# Patient Record
Sex: MALE | Race: WHITE | NOT HISPANIC OR LATINO | Employment: FULL TIME | URBAN - METROPOLITAN AREA
[De-identification: names, ages, dates, MRNs, and addresses within clinical notes are randomized per-mention and may not be internally consistent; named-entity substitution may affect disease eponyms.]

---
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
## 2021-03-24 PROCEDURE — U0003 INFECTIOUS AGENT DETECTION BY NUCLEIC ACID (DNA OR RNA); SEVERE ACUTE RESPIRATORY SYNDROME CORONAVIRUS 2 (SARS-COV-2) (CORONAVIRUS DISEASE [COVID-19]), AMPLIFIED PROBE TECHNIQUE, MAKING USE OF HIGH THROUGHPUT TECHNOLOGIES AS DESCRIBED BY CMS-2020-01-R: HCPCS | Performed by: NURSE PRACTITIONER

## 2021-03-25 ENCOUNTER — TELEPHONE (OUTPATIENT)
Dept: URGENT CARE | Facility: CLINIC | Age: 21
End: 2021-03-25

## 2021-03-25 NOTE — TELEPHONE ENCOUNTER
----- Message from ABI Mabry sent at 3/25/2021 12:36 PM EDT -----  Please contact patient with test results.  Continue plan of care.  Follow-up with PCP.

## 2024-03-28 ENCOUNTER — OFFICE VISIT (OUTPATIENT)
Dept: FAMILY MEDICINE CLINIC | Facility: CLINIC | Age: 24
End: 2024-03-28
Payer: COMMERCIAL

## 2024-03-28 VITALS
TEMPERATURE: 98.2 F | OXYGEN SATURATION: 96 % | SYSTOLIC BLOOD PRESSURE: 128 MMHG | DIASTOLIC BLOOD PRESSURE: 70 MMHG | HEART RATE: 63 BPM | RESPIRATION RATE: 18 BRPM | BODY MASS INDEX: 29.62 KG/M2 | WEIGHT: 200 LBS | HEIGHT: 69 IN

## 2024-03-28 DIAGNOSIS — L98.9 SKIN LESION OF NECK: Primary | ICD-10-CM

## 2024-03-28 DIAGNOSIS — Z76.89 ENCOUNTER TO ESTABLISH CARE: ICD-10-CM

## 2024-03-28 DIAGNOSIS — Z87.898 HISTORY OF PALPITATIONS: ICD-10-CM

## 2024-03-28 DIAGNOSIS — M79.89 NODULE OF SOFT TISSUE: ICD-10-CM

## 2024-03-28 PROBLEM — L25.9 CONTACT DERMATITIS: Status: RESOLVED | Noted: 2018-09-19 | Resolved: 2024-03-28

## 2024-03-28 PROBLEM — M54.50 LOW BACK PAIN: Status: RESOLVED | Noted: 2024-03-28 | Resolved: 2024-03-28

## 2024-03-28 PROBLEM — U07.1 COVID-19: Status: RESOLVED | Noted: 2021-03-25 | Resolved: 2024-03-28

## 2024-03-28 NOTE — PROGRESS NOTES
"Chief Complaint  Establish Care    Subjective        Alok Cespedes presents to [facility]  History of Present Illness    Patient presents today to establish care. Last PCP with Dr. Robledo in [place].  Reported history of palpitations while exercising at the gym.  This has occurred on at least 3 occasions; none recently.  Negative for any chest pain, dizziness, shortness of breath, diaphoresis, nausea and vomiting.  Reported wife concerned about skin lesions. Onset months ago. Status is changing.   Location of affected area include left neck and upper back. Quality is nodular, papular. Aggravated by nothing. Relieved by nothing.  Pertinent negatives include bleeding, edema, erythema.         Objective   Vital Signs:  /70 (BP Location: Left arm, Patient Position: Sitting, Cuff Size: Adult)   Pulse 63   Temp 98.2 °F (36.8 °C)   Resp 18   Ht 175.3 cm (69\")   Wt 90.7 kg (200 lb)   SpO2 96%   BMI 29.53 kg/m²   Estimated body mass index is 29.53 kg/m² as calculated from the following:    Height as of this encounter: 175.3 cm (69\").    Weight as of this encounter: 90.7 kg (200 lb).             Physical Exam  Constitutional:       General: He is not in acute distress.     Appearance: He is well-groomed and overweight.      Comments: Pleasant, converses appropriately    HENT:      Head: Normocephalic and atraumatic.      Right Ear: External ear normal.      Left Ear: External ear normal.      Nose: Nose normal.      Mouth/Throat:      Lips: Pink.      Mouth: Mucous membranes are moist.      Pharynx: Oropharynx is clear.   Eyes:      Conjunctiva/sclera: Conjunctivae normal.   Cardiovascular:      Rate and Rhythm: Normal rate and regular rhythm.      Chest Wall: PMI is not displaced.      Pulses: Normal pulses.      Heart sounds: Normal heart sounds, S1 normal and S2 normal.   Pulmonary:      Effort: Pulmonary effort is normal.      Breath sounds: Normal breath sounds. "   Abdominal:      General: Bowel sounds are normal.      Palpations: Abdomen is soft.      Tenderness: There is no abdominal tenderness.   Musculoskeletal:         General: Normal range of motion.      Cervical back: Neck supple.   Lymphadenopathy:      Cervical: No cervical adenopathy.      Upper Body:      Right upper body: No supraclavicular adenopathy.      Left upper body: No supraclavicular adenopathy.   Skin:     General: Skin is warm and dry.      Comments: Left neck with light brown/erythematous elevated nodule; borders regular.  Mid upper back with small mobile soft tissue nodule.    Neurological:      Mental Status: He is alert and oriented to person, place, and time.      Gait: Gait is intact.   Psychiatric:         Mood and Affect: Mood and affect normal.         Thought Content: Thought content normal.         Judgment: Judgment normal.        Result Review :                     Assessment and Plan     Diagnoses and all orders for this visit:    1. Skin lesion of neck (Primary)  -     Ambulatory Referral to Dermatology    2. Nodule of soft tissue  Comments:  Likely cyst mid upper back.  Not bothersome.  Offered ultrasound; declined today.  Will make referral to general surgery if needed.    3. Encounter to establish care  -     CBC & Differential; Future  -     Comprehensive Metabolic Panel; Future  -     Hemoglobin A1c; Future  -     Urinalysis With Culture If Indicated -; Future  -     TSH Rfx On Abnormal To Free T4; Future  -     Lipid Panel; Future    4. History of palpitations  Comments:  Will monitor.             Follow Up     Return if symptoms worsen or fail to improve.  Patient was given instructions and counseling regarding his condition or for health maintenance advice. Please see specific information pulled into the AVS if appropriate.

## 2024-03-29 ENCOUNTER — LAB (OUTPATIENT)
Dept: LAB | Facility: HOSPITAL | Age: 24
End: 2024-03-29
Payer: COMMERCIAL

## 2024-03-29 DIAGNOSIS — Z76.89 ENCOUNTER TO ESTABLISH CARE: ICD-10-CM

## 2024-03-29 LAB
ALBUMIN SERPL-MCNC: 4.6 G/DL (ref 3.5–5.2)
ALBUMIN/GLOB SERPL: 1.7 G/DL
ALP SERPL-CCNC: 45 U/L (ref 39–117)
ALT SERPL W P-5'-P-CCNC: 19 U/L (ref 1–41)
ANION GAP SERPL CALCULATED.3IONS-SCNC: 10 MMOL/L (ref 5–15)
AST SERPL-CCNC: 20 U/L (ref 1–40)
BASOPHILS # BLD AUTO: 0.02 10*3/MM3 (ref 0–0.2)
BASOPHILS NFR BLD AUTO: 0.3 % (ref 0–1.5)
BILIRUB SERPL-MCNC: 0.5 MG/DL (ref 0–1.2)
BILIRUB UR QL STRIP: NEGATIVE
BUN SERPL-MCNC: 17 MG/DL (ref 6–20)
BUN/CREAT SERPL: 13.7 (ref 7–25)
CALCIUM SPEC-SCNC: 9.3 MG/DL (ref 8.6–10.5)
CHLORIDE SERPL-SCNC: 102 MMOL/L (ref 98–107)
CHOLEST SERPL-MCNC: 139 MG/DL (ref 0–200)
CLARITY UR: CLEAR
CO2 SERPL-SCNC: 26 MMOL/L (ref 22–29)
COLOR UR: YELLOW
CREAT SERPL-MCNC: 1.24 MG/DL (ref 0.76–1.27)
DEPRECATED RDW RBC AUTO: 37.5 FL (ref 37–54)
EGFRCR SERPLBLD CKD-EPI 2021: 84.3 ML/MIN/1.73
EOSINOPHIL # BLD AUTO: 0.13 10*3/MM3 (ref 0–0.4)
EOSINOPHIL NFR BLD AUTO: 2.2 % (ref 0.3–6.2)
ERYTHROCYTE [DISTWIDTH] IN BLOOD BY AUTOMATED COUNT: 12.2 % (ref 12.3–15.4)
GLOBULIN UR ELPH-MCNC: 2.7 GM/DL
GLUCOSE SERPL-MCNC: 86 MG/DL (ref 65–99)
GLUCOSE UR STRIP-MCNC: NEGATIVE MG/DL
HBA1C MFR BLD: 5.5 % (ref 4.8–5.6)
HCT VFR BLD AUTO: 46.2 % (ref 37.5–51)
HDLC SERPL-MCNC: 42 MG/DL (ref 40–60)
HGB BLD-MCNC: 15.6 G/DL (ref 13–17.7)
HGB UR QL STRIP.AUTO: NEGATIVE
HOLD SPECIMEN: NORMAL
IMM GRANULOCYTES # BLD AUTO: 0.01 10*3/MM3 (ref 0–0.05)
IMM GRANULOCYTES NFR BLD AUTO: 0.2 % (ref 0–0.5)
KETONES UR QL STRIP: NEGATIVE
LDLC SERPL CALC-MCNC: 83 MG/DL (ref 0–100)
LDLC/HDLC SERPL: 1.98 {RATIO}
LEUKOCYTE ESTERASE UR QL STRIP.AUTO: NEGATIVE
LYMPHOCYTES # BLD AUTO: 1.84 10*3/MM3 (ref 0.7–3.1)
LYMPHOCYTES NFR BLD AUTO: 31.8 % (ref 19.6–45.3)
MCH RBC QN AUTO: 28.3 PG (ref 26.6–33)
MCHC RBC AUTO-ENTMCNC: 33.8 G/DL (ref 31.5–35.7)
MCV RBC AUTO: 83.8 FL (ref 79–97)
MONOCYTES # BLD AUTO: 0.52 10*3/MM3 (ref 0.1–0.9)
MONOCYTES NFR BLD AUTO: 9 % (ref 5–12)
NEUTROPHILS NFR BLD AUTO: 3.26 10*3/MM3 (ref 1.7–7)
NEUTROPHILS NFR BLD AUTO: 56.5 % (ref 42.7–76)
NITRITE UR QL STRIP: NEGATIVE
NRBC BLD AUTO-RTO: 0 /100 WBC (ref 0–0.2)
PH UR STRIP.AUTO: 7 [PH] (ref 5–8)
PLATELET # BLD AUTO: 245 10*3/MM3 (ref 140–450)
PMV BLD AUTO: 10.9 FL (ref 6–12)
POTASSIUM SERPL-SCNC: 4.2 MMOL/L (ref 3.5–5.2)
PROT SERPL-MCNC: 7.3 G/DL (ref 6–8.5)
PROT UR QL STRIP: NEGATIVE
RBC # BLD AUTO: 5.51 10*6/MM3 (ref 4.14–5.8)
SODIUM SERPL-SCNC: 138 MMOL/L (ref 136–145)
SP GR UR STRIP: 1.02 (ref 1–1.03)
TRIGL SERPL-MCNC: 70 MG/DL (ref 0–150)
TSH SERPL DL<=0.05 MIU/L-ACNC: 1.45 UIU/ML (ref 0.27–4.2)
UROBILINOGEN UR QL STRIP: NORMAL
VLDLC SERPL-MCNC: 14 MG/DL (ref 5–40)
WBC NRBC COR # BLD AUTO: 5.78 10*3/MM3 (ref 3.4–10.8)

## 2024-03-29 PROCEDURE — 84443 ASSAY THYROID STIM HORMONE: CPT

## 2024-03-29 PROCEDURE — 83036 HEMOGLOBIN GLYCOSYLATED A1C: CPT

## 2024-03-29 PROCEDURE — 80061 LIPID PANEL: CPT

## 2024-03-29 PROCEDURE — 85025 COMPLETE CBC W/AUTO DIFF WBC: CPT

## 2024-03-29 PROCEDURE — 81003 URINALYSIS AUTO W/O SCOPE: CPT

## 2024-03-29 PROCEDURE — 80053 COMPREHEN METABOLIC PANEL: CPT

## 2025-05-23 ENCOUNTER — OFFICE VISIT (OUTPATIENT)
Dept: FAMILY MEDICINE CLINIC | Facility: CLINIC | Age: 25
End: 2025-05-23
Payer: COMMERCIAL

## 2025-05-23 VITALS
RESPIRATION RATE: 16 BRPM | SYSTOLIC BLOOD PRESSURE: 124 MMHG | HEART RATE: 57 BPM | TEMPERATURE: 97.3 F | OXYGEN SATURATION: 98 % | BODY MASS INDEX: 29.83 KG/M2 | DIASTOLIC BLOOD PRESSURE: 80 MMHG | HEIGHT: 69 IN | WEIGHT: 201.4 LBS

## 2025-05-23 DIAGNOSIS — Z23 NEED FOR PROPHYLACTIC VACCINATION WITH TETANUS-DIPHTHERIA (TD): ICD-10-CM

## 2025-05-23 DIAGNOSIS — Z00.00 ANNUAL PHYSICAL EXAM: Primary | ICD-10-CM

## 2025-05-23 DIAGNOSIS — M25.562 PAIN IN BOTH KNEES, UNSPECIFIED CHRONICITY: ICD-10-CM

## 2025-05-23 DIAGNOSIS — Z11.59 NEED FOR HEPATITIS C SCREENING TEST: ICD-10-CM

## 2025-05-23 DIAGNOSIS — J34.89 PERFORATED NASAL SEPTUM: ICD-10-CM

## 2025-05-23 DIAGNOSIS — M25.561 PAIN IN BOTH KNEES, UNSPECIFIED CHRONICITY: ICD-10-CM

## 2025-05-23 DIAGNOSIS — Z71.85 VACCINE COUNSELING: ICD-10-CM

## 2025-05-23 NOTE — PROGRESS NOTES
Advised pt of intention to give TDAP vaccine, but that Ekta mistakenly gave him TD only. Patient understanding and made aware, patient to come back in to receive TDAP when  in next week. Advised provider, understanding and has no questions, provider advised patient can come back in to receive TDAP as he does need pertussis protection with a .

## 2025-05-23 NOTE — PROGRESS NOTES
Chief Complaint  Knee Injury and nose issue    Subjective        Alok Cespedes presents to [facility]  History of Present Illness    Patient presents today for annual physical exam.  Would also like to discuss knee pain.    Knee pain:  Onset of pain began 6 months ago. Severity of pain is mild. Frequency is intermittent.  Status is improved.  Location is bilateral knees. There is no radiation. The quality of the pain is aching. Context: hx of exercise in gym- [employer] squats (8 months ago). Felt pain in knees after-concerned.  Change in employment- sitting more often (now working in [employer] for [employer]). Aggravating factors include bending, climbing stairs, and stepping up in truck. Relieving factors include time.  Associated symptoms include popping (left), weakness (both knees).  Pertinent negatives include crepitus, decreased mobility, joint tenderness, fever, bruising, difficulty initiating sleep, joint instability, limping, locking, nocturnal awakening, numbness, spasms, swelling, tingling in legs.         Review of systems:  Constitutional: Negative for fatigue, fever, chills, weight change, night sweats, and trouble sleeping    Skin: Visit with dermatology 2024.  Negative for itching, rash, growth/lesions, dryness, change in hair or nails, lumps, abnormal mole, color change, and moisture    Eyes: Negative for visual disturbance and dry eyes    HEENT: Hx perforated septum; history of sports injury as teenager with infection; 2020 plugged; bothersome again; would like reevaluated. Negative for hoarseness, nosebleeds, post nasal drainage, snoring, sneezing, vertigo, sore throat, dry mouth, dental/oral cavity problem, nasal discharge, sinus pain, earache, tinnitus, vertigo, and difficult hearing.     Respiratory: Negative for shortness of breath, apnea, cough, sputum, and wheezing     Cardiovascular: Negative for chest pain, palpitations, dizziness, fainting, and  edema    Gastrointestinal: Negative for nausea, vomiting, heartburn, change in appetite, swallowing problem, abdominal pain, constipation, diarrhea, black, blood, or mucous in stool, and change in stool caliber    Genitourinary: Negative for frequency, urgency, hesitancy, dysuria, hematuria, testicular pain or swelling, hernia, and sexual dysfunction    Musculoskeletal: See HPI.  Negative for other joint pain and myalgia.    Neurological: Negative for headache, dizziness, numbness, weakness, tingling/paresthesia, memory change/loss, loss of consciousness, speech disturbance, tremor, unsteady gait, and restless legs    Hematologic/Lymphatic/Immunologic: Negative for easy bleeding, easy bruising, lymph node enlargement, and recurrent infection     Endocrine: Negative for polyphagia, polydipsia, polyuria, cold intolerance, heat intolerance, weight change, and excessive sweating    Psychiatric: Negative for anxiety, depression, and attention problems       PHQ-9 Depression Screening  Little interest or pleasure in doing things? Not at all   Feeling down, depressed, or hopeless? Not at all   PHQ-2 Total Score 0   Trouble falling or staying asleep, or sleeping too much?     Feeling tired or having little energy?     Poor appetite or overeating?     Feeling bad about yourself - or that you are a failure or have let yourself or your family down?     Trouble concentrating on things, such as reading the newspaper or watching television?     Moving or speaking so slowly that other people could have noticed? Or the opposite - being so fidgety or restless that you have been moving around a lot more than usual?       Thoughts that you would be better off dead, or of hurting yourself in some way?     PHQ-9 Total Score     If you checked off any problems, how difficult have these problems made it for you to do your work, take care of things at home, or get along with other people?              Objective   Vital Signs:  /80  "(BP Location: Right arm, Patient Position: Sitting, Cuff Size: Adult)   Pulse 57   Temp 97.3 °F (36.3 °C) (Infrared)   Resp 16   Ht 175.3 cm (69\")   Wt 91.4 kg (201 lb 6.4 oz)   SpO2 98%   BMI 29.74 kg/m²   Estimated body mass index is 29.74 kg/m² as calculated from the following:    Height as of this encounter: 175.3 cm (69\").    Weight as of this encounter: 91.4 kg (201 lb 6.4 oz).          Physical Exam  Vitals and nursing note reviewed.   Constitutional:       General: He is not in acute distress.     Appearance: He is well-developed.      Comments: Pleasant, converses appropriately     HENT:      Head: Normocephalic and atraumatic.      Right Ear: Hearing, tympanic membrane, ear canal and external ear normal.      Left Ear: Hearing, ear canal and external ear normal. Tympanic membrane is scarred.      Nose: No nasal tenderness, congestion or rhinorrhea.      Comments: Positive nasal septal perforation.      Mouth/Throat:      Lips: Pink.      Mouth: Mucous membranes are moist.      Pharynx: Oropharynx is clear.   Eyes:      Conjunctiva/sclera: Conjunctivae normal.   Neck:      Thyroid: No thyromegaly.   Cardiovascular:      Rate and Rhythm: Normal rate and regular rhythm.      Chest Wall: PMI is not displaced.      Pulses: Normal pulses.      Heart sounds: Normal heart sounds, S1 normal and S2 normal.   Pulmonary:      Effort: Pulmonary effort is normal.      Breath sounds: Normal breath sounds.   Abdominal:      General: Bowel sounds are normal.      Palpations: Abdomen is soft.      Tenderness: There is no abdominal tenderness.   Musculoskeletal:         General: Normal range of motion.      Cervical back: Normal range of motion and neck supple.      Right knee: No bony tenderness. Normal range of motion. No tenderness.      Left knee: No bony tenderness. Normal range of motion. No tenderness.      Right lower leg: No edema.      Left lower leg: No edema.      Comments: Bilateral upper and lower " extremity strength normal.   Lymphadenopathy:      Cervical: No cervical adenopathy.      Upper Body:      Right upper body: No supraclavicular adenopathy.      Left upper body: No supraclavicular adenopathy.   Skin:     General: Skin is warm and dry.      Findings: No rash.   Neurological:      Mental Status: He is alert and oriented to person, place, and time.      Cranial Nerves: Cranial nerves 2-12 are intact.      Motor: Motor function is intact.      Gait: Gait is intact.   Psychiatric:         Attention and Perception: Attention normal.         Mood and Affect: Mood and affect normal.         Speech: Speech normal.         Behavior: Behavior normal.         Thought Content: Thought content normal.         Cognition and Memory: Memory normal.         Judgment: Judgment normal.      Comments: Dressed appropriately.         Result Review :                Assessment and Plan   Diagnoses and all orders for this visit:    1. Annual physical exam (Primary)  Assessment & Plan:  Discussed injury prevention, diet and exercise, safe sexual practices, and screening for common diseases. Encouraged use of sunscreen and seatbelts. Discussed review of skin for lesions. Avoidance of tobacco encouraged. Limitation or avoidance of alcohol encouraged. Recommend yearly dental and eye exams. Also discussed monitoring of blood pressure and lipids.      Orders:  -     CBC & Differential; Future  -     Comprehensive Metabolic Panel; Future  -     Urinalysis With Culture If Indicated -; Future  -     Lipid Panel; Future    2. Vaccine counseling    3. Need for hepatitis C screening test  -     Hepatitis C Antibody; Future    4. Pain in both knees, unspecified chronicity  -     Ambulatory Referral to Orthopedic Surgery    5. Perforated nasal septum  -     Ambulatory Referral to ENT (Otolaryngology)    6. Need for prophylactic vaccination with tetanus-diphtheria (Td)  -     Td Vaccine => 8yo PF (Tenivac) 5-2    Other orders  -      Cancel: Tdap Vaccine => 8yo IM (BOOSTRIX/ADACEL)             Follow Up   Return in about 1 year (around 5/23/2026) for Annual physical.  Patient was given instructions and counseling regarding his condition or for health maintenance advice. Please see specific information pulled into the AVS if appropriate.

## 2025-06-23 ENCOUNTER — OFFICE VISIT (OUTPATIENT)
Dept: ORTHOPEDIC SURGERY | Facility: CLINIC | Age: 25
End: 2025-06-23
Payer: COMMERCIAL

## 2025-06-23 VITALS — WEIGHT: 195 LBS | HEIGHT: 69 IN | BODY MASS INDEX: 28.88 KG/M2

## 2025-06-23 DIAGNOSIS — M25.562 PAIN IN BOTH KNEES, UNSPECIFIED CHRONICITY: Primary | ICD-10-CM

## 2025-06-23 DIAGNOSIS — M25.561 PAIN IN BOTH KNEES, UNSPECIFIED CHRONICITY: Primary | ICD-10-CM

## 2025-06-23 PROCEDURE — 99203 OFFICE O/P NEW LOW 30 MIN: CPT | Performed by: FAMILY MEDICINE

## 2025-06-23 NOTE — PROGRESS NOTES
Primary Care Sports Medicine Office Visit Note    Patient ID: Alok Cespedes is a 24 y.o. male.    Chief Complaint:  Chief Complaint   Patient presents with    Right Knee - Pain, Initial Evaluation     On going for 1 year   Pain 0/10    Left Knee - Pain, Initial Evaluation     On going for 1 year   Pain 0/10       History of Present Illness  The patient presents for evaluation of knee pain.    Approximately 10 months ago, he experienced a sudden, sharp pain in his knee during a single-leg squat exercise. This incident was followed by a sensation of weakness in the knee, particularly noticeable when descending stairs or walking downhill. Since then, he has been experiencing intermittent episodes of similar sharp pain, triggered by actions such as rising from a kneeling position or stepping up into a truck. He also reports a clicking sound in the knee but does not experience any instability or giving way. The pain is described as manageable, but he expresses concern about potential exacerbation during gym workouts. He notes that the knee feels slightly weaker and somewhat abnormal, especially when walking downhill. Currently, he reports no significant pain but seeks clearance to resume exercising.       Past Medical History:   Diagnosis Date    Closed left arm fracture     age 13    Contact dermatitis 09/19/2018    COVID-19 03/25/2021    Low back pain 03/28/2024    Pre-syncope 10/24/2012    Tear of meniscus of knee 08/24       Past Surgical History:   Procedure Laterality Date    DENTAL PROCEDURE      NOSE SURGERY         Family History   Problem Relation Age of Onset    Cancer Father         Testicular Cancer    Hypertension Maternal Grandmother      Social History     Occupational History    Not on file   Tobacco Use    Smoking status: Never     Passive exposure: Never    Smokeless tobacco: Never   Vaping Use    Vaping status: Never Used   Substance and Sexual Activity    Alcohol use: Yes     Alcohol/week: 1.0  "standard drink of alcohol     Types: 1 Cans of beer per week     Comment: occ    Drug use: Never    Sexual activity: Yes     Partners: Female     Birth control/protection: Nexplanon        Review of Systems:  Review of Systems   Constitutional:  Negative for activity change, fatigue and fever.   Musculoskeletal:  Positive for arthralgias.   Skin:  Negative for color change and rash.   Neurological:  Negative for numbness.     Objective:  Physical Exam  Ht 175.3 cm (69\")   Wt 88.5 kg (195 lb)   BMI 28.80 kg/m²   Vitals and nursing note reviewed.   Constitutional:       General: he  is not in acute distress.     Appearance: he is well-developed. he is not diaphoretic.   HENT:      Head: Normocephalic and atraumatic.   Eyes:      Conjunctiva/sclera: Conjunctivae normal.   Pulmonary:      Effort: Pulmonary effort is normal. No respiratory distress.   Skin:     General: Skin is warm.      Capillary Refill: Capillary refill takes less than 2 seconds.   Neurological:      Mental Status: he is alert.   Right Ankle Exam     Range of Motion   The patient has normal right ankle ROM.       Left Ankle Exam     Range of Motion   The patient has normal left ankle ROM.       Right Knee Exam     Muscle Strength   The patient has normal right knee strength.    Tenderness   The patient is experiencing tenderness in the patellar tendon.    Range of Motion   The patient has normal right knee ROM.  Extension:  normal   Flexion:  normal     Tests   Ang:  Medial - negative Lateral - negative  Varus: negative Valgus: negative  Lachman:  Anterior - negative      Drawer:  Anterior - negative    Posterior - negative    Other   Erythema: absent  Sensation: normal  Pulse: present  Swelling: none  Effusion: no effusion present      Left Knee Exam     Muscle Strength   The patient has normal left knee strength.    Tenderness   The patient is experiencing tenderness in the patellar tendon.    Range of Motion   The patient has normal left " "knee ROM.  Extension:  normal   Flexion:  normal     Tests   Ang:  Medial - negative Lateral - negative  Varus: negative Valgus: negative  Lachman:  Anterior - negative      Drawer:  Anterior - negative     Posterior - negative    Other   Erythema: absent  Sensation: normal  Pulse: present  Swelling: none  Effusion: no effusion present      Right Hip Exam     Range of Motion   The patient has normal right hip ROM.      Left Hip Exam     Range of Motion   The patient has normal left hip ROM.            Results  3V XR Bilateral knees reveals no acute bony abnormality.     Diagnoses and all orders for this visit:    1. Pain in both knees, unspecified chronicity (Primary)  -     XR Knee 3 View Bilateral      Assessment & Plan  1. Patellar tendinitis.  - Reports sharp pain in the knee during specific movements, such as single leg squats and stepping into a truck.  - Physical examination reveals no instability, normal range of motion, and occasional clicking during leg extension.  - Discussed the nature of tendinitis, its causes, and the expected healing process. Advised that the condition is not permanent and should fully heal with appropriate management.  - Recommended anti-inflammatory medications (ibuprofen or Aleve) and a DonJoy patellar tendon strap brace. Advised to use the brace and medication for 3 to 4 weeks and gradually resume exercise. If symptoms persist, further evaluation may be needed.    Gee YOUNG \"Chance\" Rafael LIAR DO, CAQSM  06/23/25  16:06 EDT    Disclaimer: Please note that areas of this note were completed with computer voice recognition software.  Quite often unanticipated grammatical, syntax, homophones, and other interpretive errors are inadvertently transcribed by the computer software. Please excuse any errors that have escaped final proofreading.    Patient or patient representative verbalized consent for the use of Ambient Listening during the visit with  Gee Hall II, DO for " chart documentation. 16:06 EDT 06/23/25

## 2025-06-24 ENCOUNTER — PATIENT ROUNDING (BHMG ONLY) (OUTPATIENT)
Dept: ORTHOPEDIC SURGERY | Facility: CLINIC | Age: 25
End: 2025-06-24
Payer: COMMERCIAL